# Patient Record
Sex: FEMALE | Race: AMERICAN INDIAN OR ALASKA NATIVE
[De-identification: names, ages, dates, MRNs, and addresses within clinical notes are randomized per-mention and may not be internally consistent; named-entity substitution may affect disease eponyms.]

---
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
## 2020-02-09 ENCOUNTER — HOSPITAL ENCOUNTER (EMERGENCY)
Dept: HOSPITAL 5 - ED | Age: 20
Discharge: LEFT BEFORE DISCHARGE | End: 2020-02-09
Payer: SELF-PAY

## 2020-02-09 VITALS — DIASTOLIC BLOOD PRESSURE: 94 MMHG | SYSTOLIC BLOOD PRESSURE: 131 MMHG

## 2020-02-09 DIAGNOSIS — R10.9: Primary | ICD-10-CM

## 2020-02-09 DIAGNOSIS — Z53.21: ICD-10-CM

## 2020-02-09 LAB
BILIRUB UR QL STRIP: (no result)
BLOOD UR QL VISUAL: (no result)
MUCOUS THREADS #/AREA URNS HPF: (no result) /HPF
PH UR STRIP: 6 [PH] (ref 5–7)
RBC #/AREA URNS HPF: 6 /HPF (ref 0–6)
UROBILINOGEN UR-MCNC: 2 MG/DL (ref ?–2)
WBC #/AREA URNS HPF: 50 /HPF (ref 0–6)

## 2020-02-09 PROCEDURE — 87086 URINE CULTURE/COLONY COUNT: CPT

## 2020-02-09 PROCEDURE — 81001 URINALYSIS AUTO W/SCOPE: CPT

## 2020-02-09 PROCEDURE — 81025 URINE PREGNANCY TEST: CPT

## 2020-02-09 NOTE — EVENT NOTE
ED Screening Note


ED Screening Note: 





upper abd pain 


n/v


a couple days ago 


no diarrhea 


no fever 


+dysuria a week


PMHx gastritis


allergy: tramadol 


has a nexplanon 





This initial assessment/diagnostic orders/clinical plan/treatment(s) is/are 

subject to change based on patients health status, clinical progression and re-

assessment by fellow clinical providers in the ED. Further treatment and workup 

at subsequent clinical providers discretion. Patient/guardian urged not to elope

from the ED as their condition may be serious if not clinically assessed and 

managed. 





Initial orders include: labs, UA, urine preg

## 2020-04-11 ENCOUNTER — HOSPITAL ENCOUNTER (EMERGENCY)
Dept: HOSPITAL 5 - ED | Age: 20
LOS: 1 days | Discharge: HOME | End: 2020-04-12
Payer: SELF-PAY

## 2020-04-11 DIAGNOSIS — O23.41: Primary | ICD-10-CM
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
## 2020-04-11 DIAGNOSIS — O99.511: ICD-10-CM

## 2020-04-11 DIAGNOSIS — Z79.899: ICD-10-CM
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
## 2020-04-11 DIAGNOSIS — Z88.8: ICD-10-CM

## 2020-04-11 DIAGNOSIS — Z90.49: ICD-10-CM

## 2020-04-11 DIAGNOSIS — Z3A.01: ICD-10-CM

## 2020-04-11 DIAGNOSIS — R10.9: ICD-10-CM

## 2020-04-11 DIAGNOSIS — T14.90XA: ICD-10-CM
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
## 2020-04-11 DIAGNOSIS — J45.909: ICD-10-CM

## 2020-04-11 DIAGNOSIS — O26.891: ICD-10-CM

## 2020-04-11 PROCEDURE — 99284 EMERGENCY DEPT VISIT MOD MDM: CPT

## 2020-04-11 PROCEDURE — 76801 OB US < 14 WKS SINGLE FETUS: CPT

## 2020-04-11 PROCEDURE — 84703 CHORIONIC GONADOTROPIN ASSAY: CPT

## 2020-04-11 PROCEDURE — 85025 COMPLETE CBC W/AUTO DIFF WBC: CPT

## 2020-04-11 PROCEDURE — 87086 URINE CULTURE/COLONY COUNT: CPT

## 2020-04-11 PROCEDURE — 81001 URINALYSIS AUTO W/SCOPE: CPT

## 2020-04-11 PROCEDURE — 96361 HYDRATE IV INFUSION ADD-ON: CPT

## 2020-04-11 PROCEDURE — 96375 TX/PRO/DX INJ NEW DRUG ADDON: CPT

## 2020-04-11 PROCEDURE — 96374 THER/PROPH/DIAG INJ IV PUSH: CPT

## 2020-04-11 PROCEDURE — 84702 CHORIONIC GONADOTROPIN TEST: CPT

## 2020-04-11 PROCEDURE — 80053 COMPREHEN METABOLIC PANEL: CPT

## 2020-04-11 PROCEDURE — 76817 TRANSVAGINAL US OBSTETRIC: CPT

## 2020-04-11 PROCEDURE — 36415 COLL VENOUS BLD VENIPUNCTURE: CPT

## 2020-04-12 ENCOUNTER — HOSPITAL ENCOUNTER (EMERGENCY)
Dept: HOSPITAL 5 - ED | Age: 20
LOS: 1 days | Discharge: HOME | End: 2020-04-13
Payer: SELF-PAY

## 2020-04-12 VITALS — SYSTOLIC BLOOD PRESSURE: 111 MMHG | DIASTOLIC BLOOD PRESSURE: 76 MMHG

## 2020-04-12 DIAGNOSIS — Z88.8: ICD-10-CM

## 2020-04-12 DIAGNOSIS — J45.909: ICD-10-CM

## 2020-04-12 DIAGNOSIS — O99.511: ICD-10-CM

## 2020-04-12 DIAGNOSIS — O21.0: Primary | ICD-10-CM

## 2020-04-12 DIAGNOSIS — Z79.899: ICD-10-CM

## 2020-04-12 DIAGNOSIS — O99.331: ICD-10-CM

## 2020-04-12 DIAGNOSIS — Z3A.01: ICD-10-CM

## 2020-04-12 DIAGNOSIS — Z90.49: ICD-10-CM

## 2020-04-12 LAB
ALBUMIN SERPL-MCNC: 5.3 G/DL (ref 3.9–5)
ALT SERPL-CCNC: 14 UNITS/L (ref 7–56)
BACTERIA #/AREA URNS HPF: (no result) /HPF
BASOPHILS # (AUTO): 0.1 K/MM3 (ref 0–0.1)
BASOPHILS NFR BLD AUTO: 0.4 % (ref 0–1.8)
BENZODIAZEPINES SCREEN,URINE: (no result)
BILIRUB UR QL STRIP: (no result)
BLOOD UR QL VISUAL: (no result)
BUN SERPL-MCNC: 14 MG/DL (ref 7–17)
BUN/CREAT SERPL: 13 %
CALCIUM SERPL-MCNC: 10.6 MG/DL (ref 8.4–10.2)
EOSINOPHIL # BLD AUTO: 0 K/MM3 (ref 0–0.4)
EOSINOPHIL NFR BLD AUTO: 0.1 % (ref 0–4.3)
HCT VFR BLD CALC: 32.6 % (ref 30.3–42.9)
HEMOLYSIS INDEX: 1
HGB BLD-MCNC: 10.6 GM/DL (ref 10.1–14.3)
LYMPHOCYTES # BLD AUTO: 2.2 K/MM3 (ref 1.2–5.4)
LYMPHOCYTES NFR BLD AUTO: 12 % (ref 13.4–35)
MCHC RBC AUTO-ENTMCNC: 33 % (ref 30–34)
MCV RBC AUTO: 70 FL (ref 79–97)
METHADONE SCREEN,URINE: (no result)
MONOCYTES # (AUTO): 1.5 K/MM3 (ref 0–0.8)
MONOCYTES % (AUTO): 8.3 % (ref 0–7.3)
MUCOUS THREADS #/AREA URNS HPF: (no result) /HPF
OPIATE SCREEN,URINE: (no result)
PH UR STRIP: 6 [PH] (ref 5–7)
PLATELET # BLD: 716 K/MM3 (ref 140–440)
RBC # BLD AUTO: 4.68 M/MM3 (ref 3.65–5.03)
RBC #/AREA URNS HPF: 2 /HPF (ref 0–6)
UROBILINOGEN UR-MCNC: 2 MG/DL (ref ?–2)
WBC #/AREA URNS HPF: 9 /HPF (ref 0–6)

## 2020-04-12 PROCEDURE — 96372 THER/PROPH/DIAG INJ SC/IM: CPT

## 2020-04-12 PROCEDURE — 80307 DRUG TEST PRSMV CHEM ANLYZR: CPT

## 2020-04-12 PROCEDURE — 96360 HYDRATION IV INFUSION INIT: CPT

## 2020-04-12 PROCEDURE — 99284 EMERGENCY DEPT VISIT MOD MDM: CPT

## 2020-04-12 PROCEDURE — 96361 HYDRATE IV INFUSION ADD-ON: CPT
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
## 2020-04-13 ENCOUNTER — HOSPITAL ENCOUNTER (EMERGENCY)
Dept: HOSPITAL 5 - ED | Age: 20
Discharge: LEFT BEFORE DISCHARGE | End: 2020-04-13
Payer: SELF-PAY

## 2020-04-13 VITALS — DIASTOLIC BLOOD PRESSURE: 88 MMHG | SYSTOLIC BLOOD PRESSURE: 118 MMHG

## 2020-04-13 DIAGNOSIS — Z53.21: ICD-10-CM

## 2020-04-13 DIAGNOSIS — Z3A.00: ICD-10-CM

## 2020-04-13 DIAGNOSIS — R10.9: ICD-10-CM

## 2020-04-13 DIAGNOSIS — O26.891: Primary | ICD-10-CM

## 2020-11-10 ENCOUNTER — OUT OF OFFICE VISIT (OUTPATIENT)
Dept: URBAN - METROPOLITAN AREA MEDICAL CENTER 18 | Facility: MEDICAL CENTER | Age: 20
End: 2020-11-10
Payer: COMMERCIAL

## 2020-11-10 DIAGNOSIS — R11.0 CHRONIC NAUSEA: ICD-10-CM

## 2020-11-10 DIAGNOSIS — R10.84 ABDOMINAL CRAMPING, GENERALIZED: ICD-10-CM

## 2020-11-10 PROCEDURE — 99204 OFFICE O/P NEW MOD 45 MIN: CPT | Performed by: INTERNAL MEDICINE

## 2020-11-11 ENCOUNTER — OUT OF OFFICE VISIT (OUTPATIENT)
Dept: URBAN - METROPOLITAN AREA MEDICAL CENTER 18 | Facility: MEDICAL CENTER | Age: 20
End: 2020-11-11
Payer: COMMERCIAL

## 2020-11-11 DIAGNOSIS — K29.60 ADENOPAPILLOMATOSIS GASTRICA: ICD-10-CM

## 2020-11-11 DIAGNOSIS — B96.81 BACTERIAL INFECTION DUE TO H. PYLORI: ICD-10-CM

## 2020-11-11 PROCEDURE — 43239 EGD BIOPSY SINGLE/MULTIPLE: CPT | Performed by: INTERNAL MEDICINE

## 2020-12-02 ENCOUNTER — OUT OF OFFICE VISIT (OUTPATIENT)
Dept: URBAN - METROPOLITAN AREA MEDICAL CENTER 28 | Facility: MEDICAL CENTER | Age: 20
End: 2020-12-02
Payer: COMMERCIAL

## 2020-12-02 DIAGNOSIS — D64.89 ANEMIA DUE TO OTHER CAUSE: ICD-10-CM

## 2020-12-02 DIAGNOSIS — R10.84 ABDOMINAL CRAMPING, GENERALIZED: ICD-10-CM

## 2020-12-02 DIAGNOSIS — R19.5 ABNORMAL FECES: ICD-10-CM

## 2020-12-02 DIAGNOSIS — F12.988 CANNABINOID HYPEREMESIS SYNDROME: ICD-10-CM

## 2020-12-02 PROCEDURE — G8427 DOCREV CUR MEDS BY ELIG CLIN: HCPCS | Performed by: INTERNAL MEDICINE

## 2020-12-02 PROCEDURE — 99222 1ST HOSP IP/OBS MODERATE 55: CPT | Performed by: INTERNAL MEDICINE

## 2020-12-03 ENCOUNTER — OUT OF OFFICE VISIT (OUTPATIENT)
Dept: URBAN - METROPOLITAN AREA MEDICAL CENTER 28 | Facility: MEDICAL CENTER | Age: 20
End: 2020-12-03
Payer: COMMERCIAL

## 2020-12-03 DIAGNOSIS — R11.2 ACUTE NAUSEA WITH NONBILIOUS VOMITING: ICD-10-CM

## 2020-12-03 DIAGNOSIS — K31.89 ACQUIRED DEFORMITY OF DUODENUM: ICD-10-CM

## 2020-12-03 DIAGNOSIS — B96.81 BACTERIAL INFECTION DUE TO H. PYLORI: ICD-10-CM

## 2020-12-03 PROCEDURE — 43239 EGD BIOPSY SINGLE/MULTIPLE: CPT | Performed by: INTERNAL MEDICINE

## 2020-12-07 ENCOUNTER — TELEPHONE ENCOUNTER (OUTPATIENT)
Dept: URBAN - METROPOLITAN AREA CLINIC 92 | Facility: CLINIC | Age: 20
End: 2020-12-07

## 2020-12-07 RX ORDER — BISMUTH SUBSALICYLATE 262 MG
1 TABLET  FOUR TIMES PER DAY TABLET,CHEWABLE ORAL
Qty: 52 | Refills: 0 | OUTPATIENT
Start: 2020-12-07 | End: 2020-12-21

## 2020-12-07 RX ORDER — METRONIDAZOLE 250 MG/1
1 TABLET TABLET ORAL
Qty: 52 | Refills: 0 | OUTPATIENT
Start: 2020-12-07 | End: 2020-12-21

## 2020-12-07 RX ORDER — PANTOPRAZOLE SODIUM 40 MG/1
1 TABLET TABLET, DELAYED RELEASE ORAL BID
Qty: 28 TABLET | Refills: 0 | OUTPATIENT
Start: 2020-12-07

## 2020-12-07 RX ORDER — DOXYCYCLINE MONOHYDRATE 100 MG/1
1 CAPSULE CAPSULE ORAL
Qty: 28 CAPSULE | Refills: 0 | OUTPATIENT
Start: 2020-12-07 | End: 2020-12-21

## 2021-02-21 ENCOUNTER — OUT OF OFFICE VISIT (OUTPATIENT)
Dept: URBAN - METROPOLITAN AREA MEDICAL CENTER 18 | Facility: MEDICAL CENTER | Age: 21
End: 2021-02-21
Payer: COMMERCIAL

## 2021-02-21 DIAGNOSIS — R79.89 ELEVATED PLATELET COUNT: ICD-10-CM

## 2021-02-21 DIAGNOSIS — R11.2 ACUTE NAUSEA WITH NONBILIOUS VOMITING: ICD-10-CM

## 2021-02-21 DIAGNOSIS — D64.89 ANEMIA DUE TO OTHER CAUSE: ICD-10-CM

## 2021-02-21 DIAGNOSIS — R10.84 ABDOMINAL CRAMPING, GENERALIZED: ICD-10-CM

## 2021-02-21 PROCEDURE — 99222 1ST HOSP IP/OBS MODERATE 55: CPT | Performed by: INTERNAL MEDICINE

## 2021-02-21 PROCEDURE — G8427 DOCREV CUR MEDS BY ELIG CLIN: HCPCS | Performed by: INTERNAL MEDICINE

## 2021-02-22 ENCOUNTER — OUT OF OFFICE VISIT (OUTPATIENT)
Dept: URBAN - METROPOLITAN AREA MEDICAL CENTER 18 | Facility: MEDICAL CENTER | Age: 21
End: 2021-02-22
Payer: COMMERCIAL

## 2021-02-22 DIAGNOSIS — R10.84 ABDOMINAL CRAMPING, GENERALIZED: ICD-10-CM

## 2021-02-22 DIAGNOSIS — R11.0 CHRONIC NAUSEA: ICD-10-CM

## 2021-02-22 PROCEDURE — 99231 SBSQ HOSP IP/OBS SF/LOW 25: CPT | Performed by: PHYSICIAN ASSISTANT

## 2021-09-29 ENCOUNTER — HOSPITAL ENCOUNTER (EMERGENCY)
Dept: HOSPITAL 5 - ED | Age: 21
Discharge: LEFT BEFORE DISCHARGE | End: 2021-09-29
Payer: MEDICAID

## 2021-09-29 VITALS — SYSTOLIC BLOOD PRESSURE: 108 MMHG | DIASTOLIC BLOOD PRESSURE: 65 MMHG

## 2021-09-29 DIAGNOSIS — R10.9: Primary | ICD-10-CM

## 2021-09-29 DIAGNOSIS — Z53.21: ICD-10-CM

## 2023-04-22 ENCOUNTER — OUT OF OFFICE VISIT (OUTPATIENT)
Dept: URBAN - METROPOLITAN AREA MEDICAL CENTER 28 | Facility: MEDICAL CENTER | Age: 23
End: 2023-04-22

## 2023-04-22 ENCOUNTER — CLAIMS CREATED FROM THE CLAIM WINDOW (OUTPATIENT)
Dept: URBAN - METROPOLITAN AREA MEDICAL CENTER 28 | Facility: MEDICAL CENTER | Age: 23
End: 2023-04-22
Payer: COMMERCIAL

## 2023-04-22 DIAGNOSIS — R11.2 ACUTE NAUSEA WITH NONBILIOUS VOMITING: ICD-10-CM

## 2023-04-22 PROCEDURE — 99232 SBSQ HOSP IP/OBS MODERATE 35: CPT | Performed by: PHYSICIAN ASSISTANT

## 2023-04-22 PROCEDURE — G8427 DOCREV CUR MEDS BY ELIG CLIN: HCPCS | Performed by: PHYSICIAN ASSISTANT

## 2023-04-22 PROCEDURE — 99222 1ST HOSP IP/OBS MODERATE 55: CPT | Performed by: PHYSICIAN ASSISTANT
